# Patient Record
Sex: MALE
[De-identification: names, ages, dates, MRNs, and addresses within clinical notes are randomized per-mention and may not be internally consistent; named-entity substitution may affect disease eponyms.]

---

## 2017-07-25 ENCOUNTER — HOSPITAL ENCOUNTER (OUTPATIENT)
Dept: HOSPITAL 14 - H.OPSURG | Age: 52
Discharge: HOME | End: 2017-07-25
Payer: MEDICAID

## 2017-07-25 VITALS
SYSTOLIC BLOOD PRESSURE: 146 MMHG | OXYGEN SATURATION: 96 % | TEMPERATURE: 98 F | DIASTOLIC BLOOD PRESSURE: 92 MMHG | HEART RATE: 87 BPM

## 2017-07-25 VITALS — BODY MASS INDEX: 24.8 KG/M2

## 2017-07-25 VITALS — RESPIRATION RATE: 20 BRPM

## 2017-07-25 DIAGNOSIS — I10: ICD-10-CM

## 2017-07-25 DIAGNOSIS — L05.91: Primary | ICD-10-CM

## 2017-07-25 NOTE — PCM.SURG1
Surgeon's Initial Post Op Note





- Surgeon's Notes


Surgeon: Adriano


Assistant: Claudio PGY3, MelPGY3


Type of Anesthesia: General Endo


Pre-Operative Diagnosis: Pilonidal cyst


Operative Findings: same


Post-Operative Diagnosis: same


Operation Performed: excision of pilonidal cyst


Specimen/Specimens Removed: pilonidal cyst


Estimated Blood Loss: EBL {In ML}: 15


Blood Products Given: N/A


Drains Used: No Drains


Post-Op Condition: Good


Date of Surgery/Procedure: 07/25/17


Time of Surgery/Procedure: 08:46

## 2017-07-25 NOTE — OP
PROCEDURE DATE:  07/25/2017



SURGEON:  Nelia Oh MD



ASSISTANT:  Claudio Leal.



ANESTHESIA:  General, Dr. Cid.



PREOPERATIVE DIAGNOSIS:  Pilonidal cyst.



POSTOPERATIVE DIAGNOSIS:  Pilonidal cyst.



PROCEDURE:  Excision of pilonidal cyst.



DESCRIPTION OF PROCEDURE:  The patient was anesthetized and placed on the

operating table in a prone position.  The lower back and buttock area was

prepped and draped in the usual sterile manner.  The patient was noted to

have a 1 inch area of induration with a central opening in the upper

portion of the medial left buttock just at the upper end of the gluteal

crease and a transversely oriented elliptical incision was made surrounding

the mess, which extended inferior from the area of the punctum opening. 

The full thickness of skin was divided and the subcutaneous tissue

surrounding the area of induration was excised down to the muscle and sacral

fascia and this area was completely excised.  The operative site was

examined for hemostasis and closure was performed with interrupted mattress

suture of 3-0 Prolene.  A dry sterile dressing was applied.  The patient

tolerated the procedure well and transferred to the recovery room in stable

condition.  Estimated blood loss for the procedure was 15 mL.









__________________________________________

Nelia Oh MD







DD:  07/25/2017 9:17:16

DT:  07/25/2017 17:44:31

Job # [id number]



MTDD

## 2017-07-25 NOTE — CP.SDSHP
Same Day Surgery H & P





- Allergies


Allergies: 


Allergies





No Known Allergies Allergy (Verified 01/13/17 07:31)


 











- Physical Exam


Vital Signs: 


 Vital Signs











  07/25/17 07/25/17





  06:23 06:26


 


Temperature 98.9 F 


 


Pulse Rate 74 74


 


Respiratory 18 





Rate  


 


Blood Pressure 138/92 H 


 


O2 Sat by Pulse 95 





Oximetry  














Short Stay Discharge





- Short Stay Discharge


Admitting Diagnosis/Reason for Visit: L05.91


Disposition: HOME/ ROUTINE


Referrals: 


Cristino Rebolledo MD [Primary Care Provider] - 


Nelia Oh MD [Staff Provider] - 


Follow-up: 





Follow up in 1-2 weeks


Instructions:  Pilonidal Cyst (GEN)


Additional Instructions (Diet, Activity): 


Activity as tolerated

## 2017-08-03 ENCOUNTER — HOSPITAL ENCOUNTER (EMERGENCY)
Dept: HOSPITAL 14 - H.ER | Age: 52
Discharge: HOME | End: 2017-08-03
Payer: MEDICAID

## 2017-08-03 VITALS
HEART RATE: 96 BPM | TEMPERATURE: 98 F | DIASTOLIC BLOOD PRESSURE: 88 MMHG | OXYGEN SATURATION: 97 % | SYSTOLIC BLOOD PRESSURE: 157 MMHG

## 2017-08-03 VITALS — BODY MASS INDEX: 25 KG/M2

## 2017-08-03 VITALS — RESPIRATION RATE: 16 BRPM

## 2017-08-03 DIAGNOSIS — I10: ICD-10-CM

## 2017-08-03 DIAGNOSIS — Z48.00: Primary | ICD-10-CM

## 2017-08-03 NOTE — ED PDOC
HPI: General Adult


Time Seen by Provider: 17 12:05


Chief Complaint (Nursing): Wound Check


Chief Complaint (Provider): wound check


History Per: Patient


History/Exam Limitations: no limitations


Additional Complaint(s): 





Patient presents to the ED today for wound check of a pilonidal cyst he had 

surgically removed by Dr. Oh on 17.  He states that yesterday he was 

cleaning the wound and he noted some yellow discharge from the wound.  He notes 

wound is painful, but there no is increase in pain from his baseline from the 

time of surgery.  Patient denies any fever, and denies taking any medication 

for symptoms prior to arrival. He notes he has a follow up appt with Dr. Oh 

on 17.  





Past Medical History


Reviewed: Historical Data, Nursing Documentation, Vital Signs


Vital Signs: 


 Last Vital Signs











Temp  98 F   17 12:02


 


Pulse  96 H  17 12:02


 


Resp  16   17 12:02


 


BP  157/88 H  17 12:02


 


Pulse Ox  97   17 13:41














- Medical History


PMH: Anxiety, CAD, Depression, HTN (Does not take medications for HTN), 

Pulmonary Embolism


   Denies: HIV, Malignancy, Chronic Kidney Disease





- Surgical History


Other surgeries: Pilonidal Cyst removal





- Family History


Family History: States: MI, Hypertension





- Social History


Current smoker - smoking cessation education provided: No


Ex-Smoker (has not smoked in the last 12 months): No


Alcohol: None


Drugs: Denies





- Immunization History


Hx Tetanus Toxoid Vaccination: No


Hx Influenza Vaccination: No


Hx Pneumococcal Vaccination: No





- Home Medications


Home Medications: 


 Ambulatory Orders











 Medication  Instructions  Recorded


 


oxyCODONE/Acetaminophen [Percocet 5 - 325 mg PO Q4 PRN 17





5/325 mg Tab]  














- Allergies


Allergies/Adverse Reactions: 


 Allergies











Allergy/AdvReac Type Severity Reaction Status Date / Time


 


No Known Allergies Allergy   Verified 17 07:31














Review of Systems


ROS Statement: Except As Marked, All Systems Reviewed And Found Negative


Constitutional: Negative for: Fever, Chills, Sweats


Skin: Positive for: Other (Surgical Wound to buttock)





Physical Exam





- Reviewed


Nursing Documentation Reviewed: Yes


Vital Signs Reviewed: Yes





- Physical Exam


Appears: Positive for: Well, Non-toxic, No Acute Distress


Skin: Positive for: Normal Color (Healing incision noted to superior gluteal 

cleft with sutures in place.  Dry with no discharge noted.  Minimal surrounding 

erythema.), Warm, Dry.  Negative for: Diaphoresis, Pallor


Back: Positive for: Other (Dry, healing surgical wound noted to superior 

gluteal cleft with no discharge and minimal surrounding erythema.)


Neurologic/Psych: Positive for: Alert, Oriented, Gait (steady)





- ECG


O2 Sat by Pulse Oximetry: 97


Pulse Ox Interpretation: Normal





Medical Decision Making


Medical Decision Makin yo male presents for wound check of pilonidal abscess.


Plan: surgical consult. 


1340: Case consulted with surgical resident, Dr. Ambrose, who evaluated the pt 

at bedside, discussed case with Dr. Oh, and advises follow up as scheduled. 





Disposition





- Clinical Impression


Clinical Impression: 


 Encounter for postoperative wound check








- Patient ED Disposition


Is Patient to be Admitted: No


Counseled Patient/Family Regarding: Diagnosis, Need For Followup





- Disposition


Referrals: 


Nelia Oh MD [Staff Provider] - 


Disposition: Routine/Home


Disposition Time: 14:04


Condition: GOOD


Additional Instructions: 


Follow up as directed by surgeon. Keep wound clean and dry.


Instructions:  Abscess (GEN), Acute Wound Care (ED)


Forms:  [facility] (English)

## 2017-12-12 ENCOUNTER — HOSPITAL ENCOUNTER (EMERGENCY)
Dept: HOSPITAL 14 - H.ER | Age: 52
Discharge: HOME | End: 2017-12-12
Payer: MEDICAID

## 2017-12-12 VITALS
RESPIRATION RATE: 16 BRPM | OXYGEN SATURATION: 98 % | HEART RATE: 73 BPM | SYSTOLIC BLOOD PRESSURE: 161 MMHG | DIASTOLIC BLOOD PRESSURE: 103 MMHG | TEMPERATURE: 98.2 F

## 2017-12-12 VITALS — BODY MASS INDEX: 25 KG/M2

## 2017-12-12 DIAGNOSIS — Z86.59: ICD-10-CM

## 2017-12-12 DIAGNOSIS — S62.002A: Primary | ICD-10-CM

## 2017-12-12 DIAGNOSIS — I10: ICD-10-CM

## 2017-12-12 DIAGNOSIS — Z86.711: ICD-10-CM

## 2017-12-12 DIAGNOSIS — I25.10: ICD-10-CM
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
## 2017-12-12 NOTE — ED PDOC
Upper Extremity Pain/Injury


Chief Complaint (Nursing): Upper Extremity Problem/Injury


Additional Complaint(s): 





53yo M in ED with FOOSH injury sustained tonight with pain on ROM of hand. 


no numbness or tingling. 





Past Medical History


Reviewed: Historical Data, Nursing Documentation, Vital Signs


Vital Signs: 


 Last Vital Signs











Temp  98.2 F   12/12/17 02:52


 


Pulse  73   12/12/17 02:52


 


Resp  16   12/12/17 02:52


 


BP  161/103 H  12/12/17 02:52


 


Pulse Ox  98   12/12/17 02:52














- Medical History


PMH: Anxiety, CAD, Depression, HTN, Pulmonary Embolism


   Denies: HIV, Malignancy, Chronic Kidney Disease





- Family History


Family History: States: MI, Hypertension





- Immunization History


Hx Tetanus Toxoid Vaccination: No


Hx Influenza Vaccination: No


Hx Pneumococcal Vaccination: No





- Home Medications


Home Medications: 


 Ambulatory Orders











 Medication  Instructions  Recorded


 


Ibuprofen [Motrin] 400 mg PO Q6 #30 tab 12/12/17














- Allergies


Allergies/Adverse Reactions: 


 Allergies











Allergy/AdvReac Type Severity Reaction Status Date / Time


 


No Known Allergies Allergy   Verified 12/12/17 02:52














Review of Systems


ROS Statement: Except As Marked, All Systems Reviewed And Found Negative


Musculoskeletal: Positive for: Hand Pain





Physical Exam





- Reviewed


Nursing Documentation Reviewed: Yes


Vital Signs Reviewed: Yes





- Physical Exam


Appears: Positive for: Well, Non-toxic, No Acute Distress


Skin: Positive for: Normal Color, Warm, DRY


Cardiovascular/Chest: Positive for: Regular Rate, Rhythm


Respiratory: Positive for: CNT, Normal Breath Sounds


Extremity: Positive for: Normal ROM, Swelling (lef wrist: tenderness noted to 

wrist area no defomrity snuff box tenderness-mild), Other.  Negative for: 

Deformity


Neurologic/Psych: Positive for: Alert, Oriented





- ECG


O2 Sat by Pulse Oximetry: 98





- Radiology


X-Ray: Interpreted by Me


X-Ray Interpretation: Fracture





Medical Decision Making


Medical Decision Making: 





pt given motrin for pain and thumb spica splint for relief with f/u with pmd 

for repeat xray in 1 week. 





Disposition





- Clinical Impression


Clinical Impression: 


 Scaphoid fracture, Triquetro-lunate instability








- Patient ED Disposition


Is Patient to be Admitted: No





- Disposition


Referrals: 


Cristino Rebolledo MD [Primary Care Provider] - 


Addy Padilla MD [Staff Provider] - 


Disposition: Routine/Home


Disposition Time: 05:29


Condition: STABLE


Prescriptions: 


Ibuprofen [Motrin] 400 mg PO Q6 #30 tab


Instructions:  Scaphoid Fracture (ED)

## 2018-02-09 ENCOUNTER — HOSPITAL ENCOUNTER (EMERGENCY)
Dept: HOSPITAL 14 - H.ER | Age: 53
Discharge: HOME | End: 2018-02-09
Payer: MEDICAID

## 2018-02-09 VITALS — BODY MASS INDEX: 25.2 KG/M2

## 2018-02-09 VITALS — DIASTOLIC BLOOD PRESSURE: 86 MMHG | SYSTOLIC BLOOD PRESSURE: 150 MMHG

## 2018-02-09 VITALS — OXYGEN SATURATION: 95 % | TEMPERATURE: 97.9 F | HEART RATE: 77 BPM | RESPIRATION RATE: 20 BRPM

## 2018-02-09 DIAGNOSIS — F32.9: ICD-10-CM

## 2018-02-09 DIAGNOSIS — Z86.711: ICD-10-CM

## 2018-02-09 DIAGNOSIS — F41.9: ICD-10-CM

## 2018-02-09 DIAGNOSIS — K92.1: Primary | ICD-10-CM

## 2018-02-09 DIAGNOSIS — I10: ICD-10-CM

## 2018-02-09 DIAGNOSIS — I25.10: ICD-10-CM

## 2018-02-09 LAB
ALBUMIN SERPL-MCNC: 4 G/DL (ref 3.5–5)
ALBUMIN/GLOB SERPL: 1.3 {RATIO} (ref 1–2.1)
ALT SERPL-CCNC: 72 U/L (ref 21–72)
APTT BLD: 27.5 SECONDS (ref 25.6–37.1)
AST SERPL-CCNC: 38 U/L (ref 17–59)
BASOPHILS # BLD AUTO: 0.1 K/UL (ref 0–0.2)
BASOPHILS NFR BLD: 0.9 % (ref 0–2)
BUN SERPL-MCNC: 11 MG/DL (ref 9–20)
CALCIUM SERPL-MCNC: 9.2 MG/DL (ref 8.4–10.2)
EOSINOPHIL # BLD AUTO: 0.1 K/UL (ref 0–0.7)
EOSINOPHIL NFR BLD: 2.3 % (ref 0–4)
ERYTHROCYTE [DISTWIDTH] IN BLOOD BY AUTOMATED COUNT: 13.4 % (ref 11.5–14.5)
GFR NON-AFRICAN AMERICAN: > 60
HGB BLD-MCNC: 15.4 G/DL (ref 12–18)
INR PPP: 1 (ref 0.9–1.2)
LIPASE SERPL-CCNC: 48 U/L (ref 23–300)
LYMPHOCYTES # BLD AUTO: 1.8 K/UL (ref 1–4.3)
LYMPHOCYTES NFR BLD AUTO: 28.2 % (ref 20–40)
MCH RBC QN AUTO: 30.9 PG (ref 27–31)
MCHC RBC AUTO-ENTMCNC: 34.1 G/DL (ref 33–37)
MCV RBC AUTO: 90.5 FL (ref 80–94)
MONOCYTES # BLD: 0.4 K/UL (ref 0–0.8)
MONOCYTES NFR BLD: 7.1 % (ref 0–10)
NEUTROPHILS # BLD: 3.9 K/UL (ref 1.8–7)
NEUTROPHILS NFR BLD AUTO: 61.5 % (ref 50–75)
NRBC BLD AUTO-RTO: 0 % (ref 0–0)
PLATELET # BLD: 171 K/UL (ref 130–400)
PMV BLD AUTO: 9.4 FL (ref 7.2–11.7)
PROTHROMBIN TIME: 11.3 SECONDS (ref 9.8–13.1)
RBC # BLD AUTO: 4.98 MIL/UL (ref 4.4–5.9)
WBC # BLD AUTO: 6.3 K/UL (ref 4.8–10.8)

## 2018-02-09 PROCEDURE — 85730 THROMBOPLASTIN TIME PARTIAL: CPT

## 2018-02-09 PROCEDURE — 99283 EMERGENCY DEPT VISIT LOW MDM: CPT

## 2018-02-09 PROCEDURE — 85025 COMPLETE CBC W/AUTO DIFF WBC: CPT

## 2018-02-09 PROCEDURE — 83690 ASSAY OF LIPASE: CPT

## 2018-02-09 PROCEDURE — 85610 PROTHROMBIN TIME: CPT

## 2018-02-09 PROCEDURE — 80053 COMPREHEN METABOLIC PANEL: CPT
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
## 2018-08-01 ENCOUNTER — HOSPITAL ENCOUNTER (EMERGENCY)
Dept: HOSPITAL 14 - H.ER | Age: 53
Discharge: HOME | End: 2018-08-01
Payer: MEDICAID

## 2018-08-01 VITALS
RESPIRATION RATE: 19 BRPM | DIASTOLIC BLOOD PRESSURE: 90 MMHG | SYSTOLIC BLOOD PRESSURE: 140 MMHG | OXYGEN SATURATION: 99 % | HEART RATE: 85 BPM

## 2018-08-01 VITALS — TEMPERATURE: 98.7 F

## 2018-08-01 VITALS — BODY MASS INDEX: 25.7 KG/M2

## 2018-08-01 DIAGNOSIS — H10.9: Primary | ICD-10-CM

## 2018-08-01 NOTE — ED PDOC
HPI: Eye Injury/Pain


Time Seen by Provider: 08/01/18 10:21


Chief Complaint (Nursing): Eye Problem


History Per: Patient


Additional Complaint(s): 





Pt. states he was helping a friend open a bag of cement when particles 

accidentally flew into his R eye. He bought an OTC eye wash from the pharmacy 

yesterday. This morning he woke up with yellow discharge, R eye redness, and 

irritation to the eye. Denies visual changes, pain, fever, head injury, LOC. 





Offered  but refused.





Past Medical History


Reviewed: Historical Data, Nursing Documentation, Vital Signs


Vital Signs: 


 Last Vital Signs











Temp  98.7 F   08/01/18 10:07


 


Pulse  90   08/01/18 10:07


 


Resp  20   08/01/18 10:07


 


BP  143/91 H  08/01/18 10:07


 


Pulse Ox  98   08/01/18 10:07














- Medical History


PMH: Anxiety, CAD, Depression, HTN, Pulmonary Embolism


   Denies: HIV, Malignancy, Chronic Kidney Disease





- Family History


Family History: States: MI, Hypertension





- Immunization History


Hx Tetanus Toxoid Vaccination: No


Hx Influenza Vaccination: No


Hx Pneumococcal Vaccination: No





- Home Medications


Home Medications: 


 Ambulatory Orders











 Medication  Instructions  Recorded


 


Ibuprofen [Motrin] 400 mg PO Q6 #30 tab 12/12/17


 


Ciprofloxacin HCl [Cipro] 500 mg PO BID #14 tablet 02/09/18


 


Metronidazole [Flagyl] 500 mg PO QID #28 tablet 02/09/18


 


Erythromycin 0.5% [Erythromycin] 1 applic [facility] Q6 #1 tube 08/01/18














- Allergies


Allergies/Adverse Reactions: 


 Allergies











Allergy/AdvReac Type Severity Reaction Status Date / Time


 


No Known Allergies Allergy   Verified 08/01/18 10:20














Review of Systems


ROS Statement: Except As Marked, All Systems Reviewed And Found Negative


Eyes: Positive for: Conjunctivae Inflammation, Redness





Physical Exam





- Physical Exam


Appears: Positive for: Well, Non-toxic, No Acute Distress


Head Exam: Positive for: ATRAUMATIC, NORMAL INSPECTION, NORMOCEPHALIC


Skin: Positive for: Normal Color, Warm.  Negative for: Rash


Eye Exam: Positive for: EOMI, PERRL, Conjunctival injection (R eye), Other (no 

FB, no fluoroscein uptake, no FB in underneath both lids of R eye).  Negative 

for: Periorbital swelling, Periorbital tenderness


Neurologic/Psych: Positive for: Alert, Oriented (x 3).  Negative for: Aphasia, 

Facial Droop





- ECG


O2 Sat by Pulse Oximetry: 98





- Progress


ED Course And Treament: 





Case d/w [place], poison control, who recommends eye irrigation, fluoroscein test, 

and antibiotic treatment. States pt. does not require pH testing.


R eye irrigated with 1000 cc's of NS using dolores lens. 


Pt. reports feeling much better. Advised to f/u with Dr. Flood for further 

evaluation. 





Disposition





- Clinical Impression


Clinical Impression: 


 Conjunctivitis, Foreign body in eye








- Patient ED Disposition


Is Patient to be Admitted: No





- Disposition


Referrals: 


[facility] [Outside]


Leonard Flood MD [Staff Provider] - 


Disposition: Routine/Home


Disposition Time: 11:28


Condition: IMPROVED


Additional Instructions: 





KRISSY MATTA, thank you for letting us take care of you today. Your provider 

was Isidro Chavarria MD and you were treated for RT EYE PAIN. The emergency 

medical care you received today was directed at your acute symptoms. If you 

were prescribed any medication, please fill it and take as directed. It may 

take several days for your symptoms to resolve. Return to the Emergency 

Department if your symptoms worsen, do not improve, or if you have any other 

problems.





Please contact your doctor or call one of the physicians/clinics you have been 

referred to that are listed on the Patient Visit Information form that is 

included in your discharge packet. Bring any paperwork you were given at 

discharge with you along with any medications you are taking to your follow up 

visit. Our treatment cannot replace ongoing medical care by a primary care 

provider outside of the emergency department.





Thank you for allowing the [facility] team to be part of your care today.








If you had an X-Ray or CT scan: A Radiologist will review the ED reading if any 

change in treatment is needed we will contact you.***





If you had a blood, urine, or wound culture: It will take several days for the 

results, if any change in treatment is needed we will contact you.***





If you had an STI test: It will take 48 hours for the results. Please call 

after 1 week if you have not heard back.***


Prescriptions: 


Erythromycin 0.5% [Erythromycin] 1 applic [facility] Q6 #1 tube


Instructions:  Conjunctivitis ([place]) (DC), Foreign Body in Eye (DC)


Print Language: ENGLISH